# Patient Record
Sex: MALE | Race: WHITE | Employment: OTHER | URBAN - METROPOLITAN AREA
[De-identification: names, ages, dates, MRNs, and addresses within clinical notes are randomized per-mention and may not be internally consistent; named-entity substitution may affect disease eponyms.]

---

## 2017-03-02 PROBLEM — M1A.00X0 IDIOPATHIC CHRONIC GOUT WITHOUT TOPHUS: Status: ACTIVE | Noted: 2017-03-02

## 2017-03-14 PROCEDURE — 88305 TISSUE EXAM BY PATHOLOGIST: CPT | Performed by: FAMILY MEDICINE

## 2017-03-15 ENCOUNTER — HOSPITAL ENCOUNTER (OUTPATIENT)
Dept: LAB | Age: 67
Discharge: HOME | End: 2017-03-15

## 2017-08-24 PROBLEM — E66.01 OBESITY, MORBID, BMI 40.0-49.9 (HCC): Status: ACTIVE | Noted: 2017-08-24

## 2018-04-16 PROCEDURE — 88305 TISSUE EXAM BY PATHOLOGIST: CPT | Performed by: FAMILY MEDICINE

## 2018-04-17 ENCOUNTER — HOSPITAL ENCOUNTER (OUTPATIENT)
Dept: LAB | Age: 68
Discharge: HOME | End: 2018-04-17

## 2019-04-09 ENCOUNTER — HOSPITAL ENCOUNTER (OUTPATIENT)
Dept: DIABETES SERVICES | Age: 69
Discharge: HOME | End: 2019-04-09
Payer: MEDICARE

## 2019-04-09 PROCEDURE — G0108 DIAB MANAGE TRN  PER INDIV: HCPCS

## 2019-04-09 NOTE — PROGRESS NOTES
Came for diabetes educational assessment today. Provided basic information on carbohydrates, proteins and fats. Educational need/plan: Will attend 2 nutrition/2 diabetes sessions to address the following: diabetes disease process, nutritional management, physical activity, using medications, preventing complications, psychosocial adjustment, goal setting, problem solving, monitoring, behavior change strategies. Patient reports he use to check his blood sugars and blood pressure but when did not see a changes, he stopped. He thinks of glucometer's as \"junk\". Reports he is an  by [employer].

## 2019-04-22 ENCOUNTER — HOSPITAL ENCOUNTER (OUTPATIENT)
Dept: DIABETES SERVICES | Age: 69
Discharge: HOME | End: 2019-04-22
Payer: MEDICARE

## 2019-04-22 PROCEDURE — G0109 DIAB MANAGE TRN IND/GROUP: HCPCS

## 2019-05-29 ENCOUNTER — HOSPITAL ENCOUNTER (OUTPATIENT)
Dept: DIABETES SERVICES | Age: 69
Discharge: HOME | End: 2019-05-29
Payer: MEDICARE

## 2019-05-29 PROCEDURE — G0109 DIAB MANAGE TRN IND/GROUP: HCPCS

## 2019-05-29 NOTE — PROGRESS NOTES
Participant attended Diabetes #1 session today. Topics included: Characteristics/pathophysiology type 1/type 2 diabetes; Goal/acceptable blood glucose ranges/Hgb A1C/interpreting/using results;meters, continuous glucose monitors and insulin pumps. Using medications safely; Sick day management; Prevention/detection/treatment of acute complications. - Verbalized understanding of material covered.   -Goal for next session Nutrition Two  -Anticipated adherence is Good   -Problems/barriers may be none anticipated

## 2019-06-27 ENCOUNTER — HOSPITAL ENCOUNTER (OUTPATIENT)
Dept: DIABETES SERVICES | Age: 69
Discharge: HOME | End: 2019-06-27
Payer: MEDICARE

## 2019-06-27 PROCEDURE — G0109 DIAB MANAGE TRN IND/GROUP: HCPCS

## 2019-06-27 NOTE — PROGRESS NOTES
Participant attended Diabetes #2 session today. Topics included: Prevention/detection/treatment of chronic complications; sleep apnea; Developing strategies to promote health/change behavior/recommended screenings; Developing strategies to address psychosocial issues; Goal setting. Participants goal/support plan includes Diabetes Goal:  To lose weight (265lbs). 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 Diabetes Plan:  More work less TV. Problems/barriers may be: Watching less TV is a challenge for him. Plan for follow up/Recommendations: mail follow up survey at 6 months and one year.

## 2019-07-17 ENCOUNTER — HOSPITAL ENCOUNTER (OUTPATIENT)
Dept: PHYSICAL THERAPY | Age: 69
Discharge: HOME | End: 2019-07-17
Payer: MEDICARE

## 2019-07-17 DIAGNOSIS — M54.50 CHRONIC BILATERAL LOW BACK PAIN WITHOUT SCIATICA: ICD-10-CM

## 2019-07-17 DIAGNOSIS — G89.29 CHRONIC BILATERAL LOW BACK PAIN WITHOUT SCIATICA: ICD-10-CM

## 2019-07-17 PROCEDURE — 97110 THERAPEUTIC EXERCISES: CPT

## 2019-07-17 PROCEDURE — 97162 PT EVAL MOD COMPLEX 30 MIN: CPT

## 2019-07-17 NOTE — PROGRESS NOTES
Martínez Diaz  : 1950  Payor: Tony Mc / Plan: [address] HMO / Product Type: Managed Care Medicare /  [address] [place] [address]  at [facility] DOUGIE WANG  [address] [facility], [address], [place] [address].  [phone]   [phone]       OUTPATIENT PHYSICAL THERAPY: Daily Treatment Note 2019  Visit Count: 1     ICD-10: Treatment Diagnosis: thoracic back pain M54.6, Pain in right hip (M25.551)  Precautions/Allergies: none/Patient has no known allergies. MD Orders: PT- evaluate and treat MEDICAL/REFERRING DIAGNOSIS:  Chronic bilateral low back pain without sciatica [M54.5, G89.29] , thoracic back pain  DATE OF ONSET: 2019  REFERRING PHYSICIAN: Tanya Wilkerson DO  RETURN PHYSICIAN APPOINTMENT: none scheduled       Pre-treatment Symptoms/Complaints: Mr Michell capellan comes to therapy with complaints of mid back, right shoulder and right hip pain. Pain: Initial:   3-7 Post Session:  6/10 mid back   Medications Last Reviewed:  19    Updated Objective Findings:   Observation: decreased lumbar lordosis, flat thoracic spine, excessive right hip ER     TREATMENT:     Therapeutic Exercise: (10 Minutes):  Exercises  to improve mobility, strength and flexibility. Required maximal verbal and manual cues to promote proper body alignment, promote proper body posture, promote proper body breathing techniques and exercise technique. reviewed and issued HEP with seated hamstring and calf stretch, seated upper back extension stretch,bridging, LTR, prone hip extn, prone on elbows  HEP: perform exercises as reviewed. pt agreed    Treatment/Session Summary:    · Response to Treatment:  good return demonstration of exercises after review. very stiff right hip and right shoulder IR.   · Communication/Consultation:  None today  · Equipment provided today:  None today  · Recommendations/Intent for next treatment session: Next visit will focus on HEP review for upgrade, manual stretching. Treatment Plan of Care Effective Dates:  7/17/19 to 10/9/19. Frequency/Duration: 1 to 3 times a week for 90 days.         Total Treatment Billable Duration:  60 minutes for evaluation and exercise  PT Patient Time In/Time Out  Time In: 1300  Time Out: [address], PT    Future Appointments   Date Time Provider Michell Selena   7/23/2019  2:00 PM Shae Stone, RD [facility]   7/24/2019  1:00 PM Vin Gaspar, PT [facility]   7/30/2019  9:45 AM Vin Gaspar, PT [facility]   7/31/2019 12:00 PM Shae Stone, RD [facility]   8/6/2019  9:45 AM Vin Gaspar, PT [facility]   8/6/2019  1:40 PM PST LAB [facility] PST PST   8/13/2019  9:45 AM Vin Gaspar, PT [facility]   8/20/2019 10:00 AM Elena Wilkes DO [facility] PST PST

## 2019-07-17 NOTE — PROGRESS NOTES
Lluvia   : 1950  Payor: Jess Viera / Plan: [address] [place] [address] HMO / Product Type: Managed Care Medicare /  [address]  at [facility] DOUGIE WANG  [address] [facility], [address], [address] [facility]  [phone]   [phone]       OUTPATIENT PHYSICAL THERAPY: Daily Treatment Note 2019  Visit Count: 1     ICD-10: Treatment Diagnosis: thoracic back pain M54.6, Pain in right hip (M25.551)  Precautions/Allergies: none/Patient has no known allergies. MD Orders: PT- evaluate and treat MEDICAL/REFERRING DIAGNOSIS:  Chronic bilateral low back pain without sciatica [M54.5, G89.29] , thoracic back pain  DATE OF ONSET: 2019  REFERRING PHYSICIAN: DO LAURA Henley PHYSICIAN APPOINTMENT: none scheduled       Pre-treatment Symptoms/Complaints: Mr Michell capellan comes to therapy with complaints of mid back, right shoulder and right hip pain. Pain: Initial:   3-7 Post Session:  6/10 mid back   Medications Last Reviewed:  19    Updated Objective Findings:   Observation: decreased lumbar lordosis, flat thoracic spine, excessive right hip ER     TREATMENT:     Therapeutic Exercise: (10 Minutes):  Exercises  to improve mobility, strength and flexibility. Required maximal verbal and manual cues to promote proper body alignment, promote proper body posture, promote proper body breathing techniques and exercise technique. reviewed and issued HEP with seated hamstring and calf stretch, seated upper back extension stretch,bridging, LTR, prone hip extn, prone on elbows  HEP: perform exercises as reviewed. pt agreed    Treatment/Session Summary:    · Response to Treatment:  good return demonstration of exercises after review. very stiff right hip and right shoulder IR.   · Communication/Consultation:  None today  · Equipment provided today:  None today  · Recommendations/Intent for next treatment session: Next visit will focus on HEP review for upgrade, manual stretching. Treatment Plan of Care Effective Dates:  7/17/19 to 10/9/19. Frequency/Duration: 1 to 3 times a week for 90 days.         Total Treatment Billable Duration:  60 minutes for evaluation and exercise  PT Patient Time In/Time Out  Time In: 1300  Time Out: [address], PT    Future Appointments   Date Time Provider Michell Walters   7/23/2019  2:00 PM [facility]   7/24/2019  1:00 PM Zeeshan Blue PT [facility]   7/30/2019  9:45 AM Zeeshan Blue, PT [facility]   7/31/2019 12:00 PM ADDY [facility]   8/6/2019  9:45 AM [facility]   8/6/2019  1:40 PM PST LAB [facility] PST PST   8/13/2019  9:45 AM TAMEKA [facility]   8/20/2019 10:00 AM DO ALAN Sanchez PST PST

## 2019-07-17 NOTE — THERAPY EVALUATION
Saad Side  : 1950  Primary: Cullen Sotomayor Medicare Hmo  Secondary:  [address]  at [facility] DOUGIE WANG  [address] [facility], [address] [place] [address],[address], [place] [address].  [phone]   [phone]       OUTPATIENT PHYSICAL THERAPY:Initial Assessment 2019   ICD-10: Treatment Diagnosis: thoracic back pain M54.6, Pain in right hip (M25.551)  Precautions/Allergies: none/Patient has no known allergies. TREATMENT PLAN:  Effective Dates: 2019 TO 10/15/2019 (90 days). Frequency/Duration: 1 to 3 times a week for 90 Day(s) MEDICAL/REFERRING DIAGNOSIS:  Low back pain [M54.5]  Other chronic pain [G89.29] ,mid back pain  DATE OF ONSET: 2019  REFERRING PHYSICIAN: Makayla El DO MD Orders: PT- evaluate and treat  Return MD Appointment: none scheduled     INITIAL ASSESSMENT:  Mr. Michell capellan comes to therapy with complaints of severe painful catch in his mid back and now with pain in the right shoulder and right hip. He presents with stiffness in the thoracic spine, right shoulder and hip; tight B quad and hamstring muscles and weak hip extensors. 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 He will benefit from skilled therapy to address his deficits and assist in return to functional, independent ADL's with less pain. PROBLEM LIST (Impacting functional limitations):  1. Decreased Strength  2. Decreased ADL/Functional Activities  3. Decreased Balance  4. Increased Pain  5. Decreased Activity Tolerance  6. Decreased Flexibility/Joint Mobility INTERVENTIONS PLANNED: (Treatment may consist of any combination of the following)  1. Home Exercise Program (HEP)  2. Manual Therapy  3. Neuromuscular Re-education/Strengthening  4. Range of Motion (ROM)  5. Therapeutic Exercise/Strengthening  6. Ultrasound (US)     GOALS: (Goals have been discussed and agreed upon with patient.)  Discharge Goals: Time Frame: 90 days     1.  Independent with HEP     2. R shoulder IR to L1 and R hip AROM IR to 20 degs to allow independent ADL's with less compensation     3. Strength 5/5 to allow safe transfers     4. 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 ADL's with pain < 3/10  OUTCOME MEASURE:   Tool Used: Disabilities of the Arm, Shoulder and Hand (DASH) Questionnaire - Quick Version  Score:  Initial: 25/55 or 32%  Most Recent: X/55 (Date: -- )   Interpretation of Score: The DASH is designed to measure the activities of daily living in person's with upper extremity dysfunction or pain. Each section is scored on a 1-5 scale, 5 representing the greatest disability. The scores of each section are added together for a total score of 55. Tool Used: Modified Oswestry Low Back Pain Questionnaire  Score:  Initial: 9/50  Most Recent: X/50 (Date: -- )   Interpretation of Score: Each section is scored on a 0-5 scale, 5 representing the greatest disability. The scores of each section are added together for a total score of 50. Score 0 1-10 11-20 21-30 31-40 41-49 50   Modifier CH CI CJ CK CL CM CN     MEDICAL NECESSITY:   · Skilled intervention continues to be required due to need for manual treatment prior to exercises. REASON FOR SERVICES/OTHER COMMENTS:  · Patient continues to require skilled intervention due to need for guided progression into and through exercises. Total Duration:  PT Patient Time In/Time Out  Time In: 1300    Rehabilitation Potential For Stated Goals: Good  Regarding Sara Stovall's therapy, I certify that the treatment plan above will be carried out by a therapist or under their direction. Thank you for this referral,  Deepti De La Rosa, PT,MSPT       Referring Physician Signature:  Brie Mcdonald,  _______________________________ Date _____________     PAIN/SUBJECTIVE:   Initial:   3-7 Post Session:  6/10   HISTORY:   History of Injury/Illness (Reason for Referral):  Pt reports a gradual increase in midback pain that has become severe with attempts to stand for his job and has progressed to the right shoulder and right hip. Denies nerve pain or leg symptoms. Xray was negative  Past Medical History/Comorbidities:   Mr. Michell capellan  has a past medical history of ADD (attention deficit disorder) (5/7/2013), Binge eating disorder, DM type 2 (diabetes mellitus, type 2), gastroesophageal reflux disease, Gout Low HDL (under 40), Mixed hyperlipidemia (5/7/2013), NAFLD (nonalcoholic fatty liver disease), Obesity, plantar fascitis   Social History/Living Environment:     lives alone in one story home. No stairs. Cares for yard and laundry  Prior Level of Function/Work/Activity:  Works in his machine/automotive shop  Personal Factors:       Ambulatory/Rehab [address] [facility] [facility] [address] Assessment   Risk Factors:       (1)  Gender [Male] Ability to Rise from Chair:       (0)  Ability to rise in a single movement   Falls Prevention Plan:       Exercise/Equipment Adaptation (specify):  see treatment note   Total: (5 or greater = High Risk): 1   ©2010 [facility] [place] Patent #[id number]. Federal Law prohibits the replication, distribution or use without written permission from [facility] of [address] [place] [address]   Current Medications:       Current Outpatient Medications:     Omeprazole delayed release (PRILOSEC D/R) 20 mg tablet, Take 1 Tab by mouth daily. , Disp: 90 Tab, Rfl: 1    [START ON 8/6/2019] Lisdexamfetamine (VYVANSE) 70 mg cap, Take 1 Cap by mouth every morning. Max Daily Amount: 70 mg., Disp: 30 Cap, Rfl: 0    Lisdexamfetamine (VYVANSE) 70 mg cap, Take 1 Cap by mouth every morning for 30 days. Max Daily Amount: 70 mg., Disp: 30 Cap, Rfl: 0    Lisdexamfetamine (VYVANSE) 70 mg cap, Take 1 Cap by mouth every morning for 30 days.  Max Daily Amount: 70 mg., Disp: 30 Cap, Rfl: 0    cyclobenzaprine (FLEXERIL) 10 mg tablet, Take 1 Tab by mouth nightly., Disp: 30 Tab, Rfl: 0    amLODIPine (NORVASC) 10 mg tablet, Take 1 Tab by mouth daily., Disp: 90 Tab, Rfl: 1    atorvastatin (LIPITOR) 40 mg tablet, Take 1 Tab by mouth daily. , Disp: 90 Tab, Rfl: 1    glipiZIDE SR (GLUCOTROL XL) 10 mg CR tablet, Take 2 Tabs by mouth daily (with breakfast). , Disp: 180 Tab, Rfl: 1    levothyroxine (SYNTHROID) 25 mcg tablet, Take 1 Tab by mouth Daily (before breakfast). , Disp: 90 Tab, Rfl: 1    metFORMIN (GLUCOPHAGE) 1,000 mg tablet, Take 1 Tab by mouth two (2) times daily (with meals). , Disp: 180 Tab, Rfl: 1    tamsulosin (FLOMAX) 0.4 mg capsule, Take 1 Cap by mouth nightly., Disp: 90 Cap, Rfl: 1    metoprolol succinate (TOPROL-XL) 100 mg tablet, Take 1 Tab by mouth daily. , Disp: 90 Tab, Rfl: 1    cloNIDine HCl (CATAPRES) 0.1 mg tablet, Take 1 Tab by mouth two (2) times a day., Disp: 60 Tab, Rfl: 6    valsartan-hydroCHLOROthiazide (DIOVAN-HCT) 320-25 mg per tablet, Take 1 Tab by mouth daily. , Disp: 90 Tab, Rfl: 1    dutasteride (AVODART) 0.5 mg capsule, Take 1 Cap by mouth daily. , Disp: 30 Cap, Rfl: 11    ACCU-CHEK SOFTCLIX LANCETS misc, 1 Stick by SubCUTAneous route daily. , Disp: , Rfl: 11    allopurinol (ZYLOPRIM) 300 mg tablet, 2 tabs po qd, Disp: 180 Tab, Rfl: 1    ACCU-CHEK MUKESH PLUS TEST STRP strip, Check blood sugar once daily; E11.65, Disp: 50 Strip, Rfl: 11    MULTIVITAMIN (MULTIPLE VITAMINS PO), Take  by mouth., Disp: , Rfl:     aspirin 81 mg chewable tablet, Take 81 mg by mouth daily. , Disp: , Rfl:    Date Last Reviewed:  7/17/19   Number of Personal Factors/Comorbidities that affect the Plan of Care: 1-2: MODERATE COMPLEXITY   EXAMINATION:   Observation/Orthostatic Postural Assessment:          Flat back with no lumbar lordosis or thoracic curve, right shoulder atrophy, excessive right hip ER sitting and during gait, B knee flexion during gait  Palpation:          Stiff right hip IR, stiff right shoulder IR, tender over T9-10  ROM:    BUE WNL except for right shoulder IR to lateral buttock  BLE WNL except for right hip IR to 0 deg  Strength:          BUE and LE 5/5 except for   Right shoulder IR 3/5 lift off  Right hip IR 3+/5                Extn 4/5   Body Structures Involved:  1. Nerves  2. Bones  3. Joints  4. Muscles  5. Ligaments Body Functions Affected:  1. Sensory/Pain  2. Neuromusculoskeletal  3. Movement Related Activities and Participation Affected:  1. General Tasks and Demands  2. Mobility  3. Self Care  4. Domestic Life  5.  Community, Social and [place]a [place]   Number of elements (examined above) that affect the Plan of Care: 3: MODERATE COMPLEXITY   CLINICAL PRESENTATION:   Presentation: Evolving clinical presentation with changing clinical characteristics: MODERATE COMPLEXITY   CLINICAL DECISION MAKING:   Use of outcome tool(s) and clinical judgement create a POC that gives a: Questionable prediction of patient's progress: MODERATE COMPLEXITY

## 2019-07-23 ENCOUNTER — HOSPITAL ENCOUNTER (OUTPATIENT)
Dept: DIABETES SERVICES | Age: 69
Discharge: HOME | End: 2019-07-23
Payer: MEDICARE

## 2019-07-23 PROCEDURE — G0109 DIAB MANAGE TRN IND/GROUP: HCPCS

## 2019-07-23 NOTE — PROGRESS NOTES
Attended nutrition diabetes #2 group session today. Topics included: plate method for portion control; fiber and sodium guidelines; sugar substitutes; alcohol; eating out; recipe modification; label reading. Participant's nutrition goal: To reduce diabetes complications he will eat less than 50 grams of carbs as a lifestyle change. Participant's nutrition support plan: none provided  Barriers identified by participant: rationalization, obsessive-compulsive eating  Voiced/demonstrated understanding of material covered. Anticipated adherence is good. Plan for follow up is: will be sent a follow up questionnaire at 6 months and one year.

## 2019-07-24 ENCOUNTER — APPOINTMENT (OUTPATIENT)
Dept: PHYSICAL THERAPY | Age: 69
End: 2019-07-24
Payer: MEDICARE

## 2019-07-30 ENCOUNTER — HOSPITAL ENCOUNTER (OUTPATIENT)
Dept: PHYSICAL THERAPY | Age: 69
Discharge: HOME | End: 2019-07-30
Payer: MEDICARE

## 2019-07-30 PROCEDURE — 97110 THERAPEUTIC EXERCISES: CPT

## 2019-07-30 NOTE — PROGRESS NOTES
Lida De Jesus  : 1950  Payor: Teo Files / Plan: [address] HMO / Product Type: Managed Care Medicare /  [address] [place] [address]  at [address] [place] [address] Rd  [address] [facility], [address], [place] [address].  [phone]   [phone]       OUTPATIENT PHYSICAL THERAPY: Daily Treatment Note 2019  Visit Count: 2     ICD-10: Treatment Diagnosis: thoracic back pain M54.6, Pain in right hip (M25.551)    Precautions/Allergies: none/Patient has no known allergies. MD Orders: PT- evaluate and treat MEDICAL/REFERRING DIAGNOSIS:  Low back pain [M54.5]  Other chronic pain [G89.29] , thoracic back pain  DATE OF ONSET: 2019  REFERRING PHYSICIAN: Mandie Gordon DO  RETURN PHYSICIAN APPOINTMENT: none scheduled       Pre-treatment Symptoms/Complaints: did not do the HEP due to traveling. Felt better after the last session. Stiff and pain in the mid back   Pain: Initial:   3-4 Post Session:  2-3/10 mid back   Medications Last Reviewed:  19    Updated Objective Findings:   Observation: decreased active movement of the thoracic spine, excessive right hip ER  Palpation: stiff and tender T10, tight B quads   TREATMENT:     Therapeutic Exercise: (40 Minutes): prone B quad and hip rotation stretching; grade 4- central PA glides at T9- T11. Exercises  to improve mobility, strength and flexibility. Required maximal verbal and manual cues to promote proper body alignment, promote proper body posture, promote proper body breathing techniques and exercise technique. reviewed HEP with seated hamstring and calf stretch, seated and standing upper back extension stretch,bridging, LTR, prone hip extn, prone on elbows  HEP: perform exercises as reviewed. pt agreed    Treatment/Session Summary:    · Response to Treatment:  good return demonstration of exercises after review but needed encouragement to perform back stretching.  Stiff T spine  · Communication/Consultation:  None today  · Equipment provided today:  None today  · Recommendations/Intent for next treatment session: Next visit will focus on HEP upgrade, manual stretching. Assess R shoulder IR    Treatment Plan of Care Effective Dates:  7/17/19 to 10/9/19. Frequency/Duration: 1 to 3 times a week for 90 days.         Total Treatment Billable Duration:  40 minutes   PT Patient Time In/Time Out  Time In: 8935  Time Out: [address] [place], [place]    Future Appointments   Date Time Provider Michell Walters   8/6/2019  9:45 AM Catheline Less[facility]   8/6/2019  1:40 PM PST LAB [facility] PST PST   8/13/2019  9:45 AM Catheline Less[facility]   8/20/2019 10:00 AM Florinda Broussard DO [facility] PST PST   8/29/2019 12:00  [address], [facility], [address]

## 2019-08-06 ENCOUNTER — HOSPITAL ENCOUNTER (OUTPATIENT)
Dept: PHYSICAL THERAPY | Age: 69
Discharge: HOME | End: 2019-08-06
Payer: MEDICARE

## 2019-08-06 PROCEDURE — 97110 THERAPEUTIC EXERCISES: CPT

## 2019-08-06 NOTE — PROGRESS NOTES
Sapna Carrera  : 1950  Payor: Conception Inge / Plan: [address] HMO / Product Type: Managed Care Medicare /  [facility]  at [facility] DOUGIE WANG  [address] [place] [address], [address], [place] [address].  [phone]   [phone]       OUTPATIENT PHYSICAL THERAPY: Daily Treatment Note 2019  Visit Count: 3     ICD-10: Treatment Diagnosis: thoracic back pain M54.6, Pain in right hip (M25.551)  Precautions/Allergies: none/Patient has no known allergies. MD Orders: PT- evaluate and treat MEDICAL/REFERRING DIAGNOSIS:  Low back pain [M54.5]  Other chronic pain [G89.29] , thoracic back pain  DATE OF ONSET: 2019  REFERRING PHYSICIAN: Slim Larson DO  RETURN PHYSICIAN APPOINTMENT: none scheduled       Pre-treatment Symptoms/Complaints: did not do the HEP. Feel sore all over from having to cut up a tree that fell in his yard. Reports pain in the right hip area causes him to have a catching collapse. Pain: Initial:   3-5/10 Post Session:  -3/10 mid back   Medications Last Reviewed:  19  Not taking the oxycodone any more. Taking aspirin instead     Updated Objective Findings:   Observation: pt noted to bend forward with movement at the thoracic spine verses low back. Pt unable to lift off either arm IR off the back  Palpation: very stiff IR with the right more so than the left. TREATMENT:     Therapeutic Exercise: (44 Minutes):  Exercises  to improve mobility, strength and flexibility. Required maximal verbal and manual cues to promote proper body alignment, promote proper body posture, promote proper body breathing techniques and exercise technique. reviewed HEP with seated hip hinging sequence to allow trunk flexion at the waist. Reviewed body mechanics with using legs verses mid back. Handout given.    Date:  19 Date:   Date:     Activity/Exercise Parameters Parameters Parameters   LTR Over green tball x30     Bridging with B knee extn Over green tball 2x15 Pelvic tilts with exhalation Over green tball 2x12     Hip squeeze with exhalation hooklying against ball 2x15     Hip IR B side 1.5# 2x10                   HEP: perform hip hinging exercises as reviewed and continue current program. pt agreed    Treatment/Session Summary:    · Response to Treatment:  good return demonstration of exercises after review. Needed multiple cues to not hold his breath and to decrease speed  · Communication/Consultation:  None today  · Equipment provided today:  None today  · Recommendations/Intent for next treatment session: Next visit will focus on HEP review, manual stretching as needed, body mechanics education     Treatment Plan of Care Effective Dates:  7/17/19 to 10/9/19. Frequency/Duration: 1 to 3 times a week for 90 days.         Total Treatment Billable Duration:  44 minutes   PT Patient Time In/Time Out  Time In: 9459  Time Out: [address], PT    Future Appointments   Date Time Provider Michell Walters   8/6/2019  1:40 PM PST LAB [facility] PST PST   8/13/2019  9:45 AM Rajni Saenz PT [facility]   8/20/2019 10:00 AM Rosmery Rhodes DO [facility] PST PST   8/29/2019 12:00  [address], [place], RD [address] [facility]

## 2019-08-13 ENCOUNTER — HOSPITAL ENCOUNTER (OUTPATIENT)
Dept: PHYSICAL THERAPY | Age: 69
Discharge: HOME | End: 2019-08-13
Payer: MEDICARE

## 2019-08-13 PROCEDURE — 97140 MANUAL THERAPY 1/> REGIONS: CPT

## 2019-08-13 PROCEDURE — 97110 THERAPEUTIC EXERCISES: CPT

## 2019-08-13 NOTE — PROGRESS NOTES
Alma Maxwell  : 1950  Payor: Syl Francis / Plan: [address] HMO / Product Type: Managed Care Medicare /  [address] [place] [address]  at [facility] DOUGIE WANG  [address] [facility], [address], [place] [address].  [phone]   [phone]       OUTPATIENT PHYSICAL THERAPY: Daily Treatment Note 2019  Visit Count: 4     ICD-10: Treatment Diagnosis: thoracic back pain M54.6, Pain in right hip (M25.551)    Precautions/Allergies: none/Patient has no known allergies. MD Orders: PT- evaluate and treat MEDICAL/REFERRING DIAGNOSIS:  Low back pain [M54.5]  Other chronic pain [G89.29] , thoracic back pain  DATE OF ONSET: 2019  REFERRING PHYSICIAN: Elliott Maldonado DO  RETURN PHYSICIAN APPOINTMENT: none scheduled       Pre-treatment Symptoms/Complaints: did the HEP. Feel loosened up after therapy. Hurt getting out of bed and when he gets up from sitting. Pain: Initial:  3-4 /10 Post Session:  2-3/10 mid back   Medications Last Reviewed:  19  Taking aspirin for pain     Updated Objective Findings:   Observation: pt noted with better erect posture. Palpation:  Stiff R shoulder IR and ER, stiff and tender T8   TREATMENT:   Manual treatment: ( 12 minutes): grade 4 right translation glides of T8 and T9, grade 4-- R unilateral PA mobilizations of T8  Therapeutic Exercise: (28 Minutes):  Exercises  to improve mobility, strength and flexibility. Required moderate verbal and manual cues to promote proper body alignment, promote proper body posture, promote proper body breathing techniques and exercise technique. Reviewed hip hinging. stretching of B upper trunk rotation with exhalation.  Grade 3 to 4-- R shoulder ER and IR physiological mobilizations   Date:  19 Date:  19 Date:     Activity/Exercise Parameters Parameters Parameters   LTR Over green tball x30 1# B ankles 2x10    Bridging with B knee extn Over green tball 2x15 1# B ankels 2x10    Pelvic tilts with exhalation Over green tball 2x12     Hip squeeze with exhalation hooklying against ball 2x15     Hip IR B side 1.5# 2x10                   HEP:continue current program with stretching. pt agreed    Treatment/Session Summary:    · Response to Treatment:  good return demonstration of exercises with increased weight  · Communication/Consultation:  None today  · Equipment provided today:  None today  · Recommendations/Intent for next treatment session: Next visit will focus on manual stretching as needed, body mechanics education. Upgrade HEP as appropriate     Treatment Plan of Care Effective Dates:  7/17/19 to 10/9/19. Frequency/Duration: 1 to 3 times a week for 90 days.         Total Treatment Billable Duration:  40 minutes   PT Patient Time In/Time Out  Time In: 4116  Time Out: [address] [place], [place]    Future Appointments   Date Time Provider Michell Walters   8/21/2019  2:40 PM DO ALAN Duran PST PST   8/29/2019 12:00  [address], [facility], [place] [address] [facility]

## 2019-08-21 PROBLEM — E03.9 ACQUIRED HYPOTHYROIDISM: Status: ACTIVE | Noted: 2019-08-21

## 2019-08-29 ENCOUNTER — HOSPITAL ENCOUNTER (OUTPATIENT)
Dept: DIABETES SERVICES | Age: 69
Discharge: HOME | End: 2019-08-29
Payer: MEDICARE

## 2019-08-29 PROCEDURE — G0109 DIAB MANAGE TRN IND/GROUP: HCPCS

## 2019-08-29 NOTE — PROGRESS NOTES
Attended diabetes follow up group class. Open forum for clients to ask questions based on entire diabetes self management education program. Education topics are driven in this class based on clients' individual questions and needs. Topics included: meal planning, weight loss, eating out, fats, cholesterol, exercise, Hgb A1C, stress/depression, annual eye exam and prevention of flu and pneumonia.

## 2019-11-27 NOTE — PROGRESS NOTES
Lida De Jesus  : 1950  Primary: Renny Sotomayor Medicare Hmo  Secondary:  [address] at [facility] DOUGIE WANG  [address] [facility], [address] [place] [address],[address], [address] [facility]  [phone]   [phone]       OUTPATIENT PHYSICAL THERAPY:Discontinuation Summary 2019   ICD-10: Treatment Diagnosis: thoracic back pain M54.6, Pain in right hip (M25.551)  Precautions/Allergies: none/Patient has no known allergies. PLAN: discontinue to HEP   MEDICAL/REFERRING DIAGNOSIS:  Low back pain [M54.5]  Other chronic pain [G89.29] ,mid back pain  DATE OF ONSET: 2019  REFERRING PHYSICIAN: Mandie Gordon DO MD Orders: PT- evaluate and treat  Return MD Appointment: none scheduled    ASSESSMENT:  Mr. Michell capellan came to therapy with complaints of severe painful catch in his mid back and now with pain in the right shoulder and right hip. He attended 4/4 sessions with the focus on spinal ROM and strengthening. B shoulder stiffness was noted to contribute to his mid back pain. He reported doing the HEP but continued to have pain. Will be discontinued due to not returning to therapy. Discharge Goals: - unable to assess     1. Independent with HEP     2. R shoulder IR to L1 and R hip AROM IR to 20 degs to allow independent ADL's with less compensation     3. Strength 5/5 to allow safe transfers     4. 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 ADL's with pain < 3/10  OUTCOME MEASURE:   Tool Used: Disabilities of the Arm, Shoulder and Hand (DASH) Questionnaire - Quick Version  Score:  Initial: 25/55 or 32%  Most Recent: unable to assess   Interpretation of Score: The DASH is designed to measure the activities of daily living in person's with upper extremity dysfunction or pain. Each section is scored on a 1-5 scale, 5 representing the greatest disability. The scores of each section are added together for a total score of 55.       Tool Used: Modified Oswestry Low Back Pain Questionnaire  Score:  Initial: 9/50  Most Recent: unable to assess   Interpretation of Score: Each section is scored on a 0-5 scale, 5 representing the greatest disability. The scores of each section are added together for a total score of 50. Score 0 1-10 11-20 21-30 31-40 41-49 50   Modifier CH CI CJ CK CL CM CN          PAIN/SUBJECTIVE:   Initial:   3 Post Session:  2/10   HISTORY:   History of Injury/Illness (Reason for Referral):  Pt reports a gradual increase in midback pain that has become severe with attempts to stand for his job and has progressed to the right shoulder and right hip. Denies nerve pain or leg symptoms. Xray was negative  Past Medical History/Comorbidities:   Mr. Gabby Escobedo  has a past medical history of ADD (attention deficit disorder) (5/7/2013), Binge eating disorder, DM type 2 (diabetes mellitus, type 2), gastroesophageal reflux disease, Gout Low HDL (under 40), Mixed hyperlipidemia (5/7/2013), NAFLD (nonalcoholic fatty liver disease), Obesity, plantar fascitis   Social History/Living Environment:     lives alone in one story home. No stairs. Cares for yard and laundry  Prior Level of Function/Work/Activity:  Works in his machine/automotive shop        EXAMINATION:   Observation/Orthostatic Postural Assessment:          Flat back with no lumbar lordosis or thoracic curve.  More erect posture with correction  Palpation:          Stiff right hip IR, stiff B shoulder , tender over T spine  ROM:  Unable to address  Strength: unable to assess

## 2022-03-19 PROBLEM — M1A.00X0 IDIOPATHIC CHRONIC GOUT WITHOUT TOPHUS: Status: ACTIVE | Noted: 2017-03-02

## 2022-03-19 PROBLEM — E66.01 OBESITY, MORBID, BMI 40.0-49.9 (HCC): Status: ACTIVE | Noted: 2017-08-24

## 2022-03-20 PROBLEM — E03.9 ACQUIRED HYPOTHYROIDISM: Status: ACTIVE | Noted: 2019-08-21

## 2022-06-27 ENCOUNTER — OFFICE VISIT (OUTPATIENT)
Dept: ENT CLINIC | Age: 72
End: 2022-06-27
Payer: COMMERCIAL

## 2022-06-27 VITALS
SYSTOLIC BLOOD PRESSURE: 140 MMHG | DIASTOLIC BLOOD PRESSURE: 90 MMHG | HEIGHT: 72 IN | WEIGHT: 287 LBS | BODY MASS INDEX: 38.87 KG/M2

## 2022-06-27 DIAGNOSIS — J02.9 SORE THROAT: Primary | ICD-10-CM

## 2022-06-27 DIAGNOSIS — K21.9 LARYNGOPHARYNGEAL REFLUX (LPR): ICD-10-CM

## 2022-06-27 PROCEDURE — 31575 DIAGNOSTIC LARYNGOSCOPY: CPT | Performed by: PHYSICIAN ASSISTANT

## 2022-06-27 PROCEDURE — 1123F ACP DISCUSS/DSCN MKR DOCD: CPT | Performed by: PHYSICIAN ASSISTANT

## 2022-06-27 PROCEDURE — 99212 OFFICE O/P EST SF 10 MIN: CPT | Performed by: PHYSICIAN ASSISTANT

## 2022-06-27 ASSESSMENT — ENCOUNTER SYMPTOMS
COUGH: 0
EYE DISCHARGE: 0
ABDOMINAL PAIN: 0

## 2022-06-27 NOTE — PROGRESS NOTES
Chief Complaint   Patient presents with    Follow-up     Patient here for follow-up on his sore throat. Patient states that he is better since they  adjusted his prilosec to 40mg. HPI:  Joceline Aviles is a 71 y.o. male seen for evaluation of sore throat and LPR. He reports that he has been doing better with omeprazole 40 mg. He denies dysphagia, odynophagia, cough, hoarseness or sore throat. Past Medical History, Past Surgical History, Family history, Social History, and Medications were all reviewed with the patient today and updated as necessary.      No Known Allergies  Patient Active Problem List   Diagnosis    Routine health maintenance    Elevated LFTs    Low HDL (under 40)    Benign prostatic hyperplasia with lower urinary tract symptoms    Obesity, morbid, BMI 40.0-49.9 ([facility]    GERD without esophagitis    Essential hypertension    Mixed hyperlipidemia    Binge eating disorder    Type 2 diabetes mellitus without complication, without long-term current use of insulin ([facility])    Class 3 severe obesity due to excess calories with serious comorbidity and body mass index (BMI) of 40.0 to 44.9 in adult ([facility]    Proteinuria    Idiopathic chronic gout without tophus    NAFLD (nonalcoholic fatty liver disease)    Acquired hypothyroidism     Current Outpatient Medications   Medication Sig    Accu-Chek Softclix Lancets MISC Inject 1 Stick into the skin daily    allopurinol (ZYLOPRIM) 300 MG tablet 2 tabs po qd    amLODIPine (NORVASC) 10 MG tablet Take 10 mg by mouth daily    aspirin 81 MG chewable tablet Take 81 mg by mouth daily    atorvastatin (LIPITOR) 40 MG tablet Take 40 mg by mouth daily    cloNIDine (CATAPRES) 0.1 MG tablet Take 0.1 mg by mouth 2 times daily    cyclobenzaprine (FLEXERIL) 10 MG tablet Take 10 mg by mouth    dutasteride (AVODART) 0.5 MG capsule Take 0.5 mg by mouth daily    glipiZIDE (GLUCOTROL XL) 10 MG extended release tablet Take 20 mg by mouth daily (with breakfast)    levothyroxine (SYNTHROID) 25 MCG tablet Take 25 mcg by mouth every morning (before breakfast)    lisdexamfetamine (VYVANSE) 70 MG capsule Take 70 mg by mouth.  metFORMIN (GLUCOPHAGE) 1000 MG tablet Take 1,000 mg by mouth 2 times daily (with meals)    metoprolol succinate (TOPROL XL) 100 MG extended release tablet Take 100 mg by mouth daily    omeprazole (PRILOSEC OTC) 20 MG tablet Take 40 mg by mouth daily     tamsulosin (FLOMAX) 0.4 MG capsule Take 0.4 mg by mouth    valsartan-hydroCHLOROthiazide (DIOVAN-HCT) 320-25 MG per tablet Take 1 tablet by mouth daily     No current facility-administered medications for this visit. Past Medical History:   Diagnosis Date    ADD (attention deficit disorder) 2013    Arthritis     Aung thumbs, right shoulder    Binge eating disorder     Burning with urination     Frequency    Chronic pain     right shoulder    DM type 2 (diabetes mellitus, type 2) ([facility] [address] [facility] 2013    GERD (gastroesophageal reflux disease) 2013    Gout 2013    HTN (hypertension), benign 2013    Low HDL (under 40)     Mixed hyperlipidemia 2013    NAFLD (nonalcoholic fatty liver disease)     Obesity     Proteinuria     Sleep apnea     Stool color black     Constipation    Thyroid disease      Social History     Tobacco Use    Smoking status: Former Smoker     Packs/day: 2.00     Quit date: 10/16/2007     Years since quittin.7    Smokeless tobacco: Never Used   Substance Use Topics    Alcohol use:  Yes     Alcohol/week: 1.0 standard drink     Past Surgical History:   Procedure Laterality Date    CHOLECYSTECTOMY      CYST REMOVAL      x2    CYST REMOVAL  1986    ORTHOPEDIC SURGERY      left forearm lipoma excision    OTHER SURGICAL HISTORY      uvulaectomy    VASECTOMY       Family History   Problem Relation Age of Onset    Cancer Father         lung    Diabetes Father         borderline    No Known Problems Mother         ROS:    Review of Systems   Constitutional: Negative for fever. HENT: Negative for congestion. Eyes: Negative for discharge. Respiratory: Negative for cough. Gastrointestinal: Negative for abdominal pain. Musculoskeletal: Negative for neck pain. Skin: Negative for rash. Allergic/Immunologic: Negative for food allergies. Neurological: Negative for dizziness. Hematological: Negative for adenopathy. PHYSICAL EXAM:    BP (!) 140/90 (Site: Left Upper Arm, Position: Sitting)   Ht 6' (1.829 m)   Wt 287 lb (130.2 kg)   BMI 38.92 kg/m²     Head  Head and Face - The head and face are atraumatic, normocephalic. The salivary glands are intact and the facial appearance is symmetric. Head shape - No scars, lesions, or masses    Ear  Ear - Tympanic membranes are clear, the external auditory canal is without discharge and the tympanic membranes are mobile. There is no tympanic membrane erythema and no middle ear opacity is visualized. Pinna: bilateral - No hematomas or lacerations    Eye  Eyeball - bilateral - extraocular motions intact, equal in size and movement    Nose and Sinuses  Nose - mucosa is pink and the septum is deviated right. There are no nasal lesions and there was no turbinate hypertrophy. Mouth and Throat  Lips - upper lip - normal: no dryness, cracking, pallor, cyanosis, or vesicular eruption. Lower lip: normal: no dryness, cracking, pallor, cyanosis, or vesicular eruption. Teeth and Gums - No bleeding, no inflammation or ulceration. Lips - Pink and symmetrical  Oral Cavity - Oral mucosa pink, soft and hard palates contiguous and tongue moist without ulcers. The mucosa is without ulcerations. No oral cavity masses present. Parotid Gland - Bilateral - Non tender, not swollen. Oropharynx - No discharge or Erythema  Nasopharynx - Non obstructed, mucosa pink and moist.    Hypopharynx - No erythema  Submandibular Gland - Non tender, not swollen.     Tonsils - Normal    Neck   Neck - Full range of motion and Supple. Non Tender. No Masses. Trachea - Midline. Thyroid - Gland - Symmetric. Non Tender. Nodules - No nodules. Neurologic - II - XII Grossly intact bilaterally    Cardiac  Inspection - Jugular Vein:  Bilateral - non distended, no prominent pulsations    Chest and Lung  Inspection - Movements:  Chest symmetrical with bilateral expansion, respirations even and non labored    Laryngoscopy Procedure Note        Indications   A laryngoscopy will be performed due to laryngopharyngeal reflux . Procedure Details     Laryngoscopy was performed today under Rhinocaine was applied topically. Findings  The findings include: septum deviated, nasal cavity and nasopharynx without masses or obstructions, base of tongue without mass or asymmetry, true vocal cords mobile bilaterally, extraesophageal reflux, erythema of the laryngeal surface of the epiglottis, erythema of arytenoids, no true vocal cord lesion or mass, no pyriform sinus mass and no supraglottic laryngeal mass. Patient Response   The patient tolerated the procedure well. .            Procedure Impression  Extraesophageal reflux (LPR). Moderate    ASSESSMENT and PLAN      ICD-10-CM    1. Sore throat  J02.9 LARYNGOSCOPY,FLEX FIBER,DIAGNOSTIC   2. Laryngopharyngeal reflux (LPR)  K21.9 LARYNGOSCOPY,FLEX FIBER,DIAGNOSTIC       I will have patient continue the omeprazole 40 mg daily for another 2 months and then decrease to 20 mg. We did discuss that he has been on reflux medication for years and not had EGD. He may need to see GI at some point, he will monitor his symptoms. Patient will return to the clinic as needed.     Feliciano Dixon PA-C  6/27/2022